# Patient Record
Sex: MALE | Race: WHITE | Employment: FULL TIME | URBAN - METROPOLITAN AREA
[De-identification: names, ages, dates, MRNs, and addresses within clinical notes are randomized per-mention and may not be internally consistent; named-entity substitution may affect disease eponyms.]

---

## 2022-02-19 ENCOUNTER — HOSPITAL ENCOUNTER (EMERGENCY)
Age: 57
Discharge: HOME | End: 2022-02-19
Attending: EMERGENCY MEDICINE
Payer: COMMERCIAL

## 2022-02-19 VITALS
DIASTOLIC BLOOD PRESSURE: 99 MMHG | WEIGHT: 200 LBS | OXYGEN SATURATION: 97 % | HEART RATE: 65 BPM | SYSTOLIC BLOOD PRESSURE: 174 MMHG | BODY MASS INDEX: 28 KG/M2 | HEIGHT: 71 IN | TEMPERATURE: 97.6 F | RESPIRATION RATE: 16 BRPM

## 2022-02-19 DIAGNOSIS — K04.7 DENTAL INFECTION: ICD-10-CM

## 2022-02-19 DIAGNOSIS — K08.89 DENTALGIA: ICD-10-CM

## 2022-02-19 DIAGNOSIS — K02.9 DENTAL CARIES: Primary | ICD-10-CM

## 2022-02-19 DIAGNOSIS — R22.0 LEFT FACIAL SWELLING: ICD-10-CM

## 2022-02-19 PROCEDURE — 99283 EMERGENCY DEPT VISIT LOW MDM: CPT

## 2022-02-19 PROCEDURE — 6370000000 HC RX 637 (ALT 250 FOR IP): Performed by: EMERGENCY MEDICINE

## 2022-02-19 RX ORDER — AMOXICILLIN AND CLAVULANATE POTASSIUM 875; 125 MG/1; MG/1
1 TABLET, FILM COATED ORAL 2 TIMES DAILY
Qty: 14 TABLET | Refills: 0 | Status: SHIPPED | OUTPATIENT
Start: 2022-02-19 | End: 2022-02-26

## 2022-02-19 RX ORDER — IBUPROFEN 800 MG/1
800 TABLET ORAL EVERY 8 HOURS PRN
Qty: 21 TABLET | Refills: 0 | Status: SHIPPED | OUTPATIENT
Start: 2022-02-19 | End: 2022-02-26

## 2022-02-19 RX ORDER — IBUPROFEN 800 MG/1
800 TABLET ORAL ONCE
Status: COMPLETED | OUTPATIENT
Start: 2022-02-19 | End: 2022-02-19

## 2022-02-19 RX ORDER — AMOXICILLIN AND CLAVULANATE POTASSIUM 875; 125 MG/1; MG/1
1 TABLET, FILM COATED ORAL ONCE
Status: COMPLETED | OUTPATIENT
Start: 2022-02-19 | End: 2022-02-19

## 2022-02-19 RX ADMIN — AMOXICILLIN AND CLAVULANATE POTASSIUM 1 TABLET: 875; 125 TABLET, FILM COATED ORAL at 12:45

## 2022-02-19 RX ADMIN — IBUPROFEN 800 MG: 800 TABLET, FILM COATED ORAL at 12:45

## 2022-02-19 ASSESSMENT — PAIN SCALES - GENERAL: PAINLEVEL_OUTOF10: 10

## 2022-02-19 NOTE — ED PROVIDER NOTES
[address] [facility]  Department of Emergency Medicine   ED  Encounter Note  Admit Date/RoomTime: 2022 12:22 PM  ED Room:     NAME: Kristina Perez  : 1965  MRN: [id number]     Chief Complaint:  Dental Pain (TOP LEFT TOOTH PAIN SWELLING)    History of Present Illness        Kristina Perez is a 61 y.o. old male who presents to the emergency department by private vehicle, for non-traumatic left upper tooth and face pain, which occured 1 day(s) prior to arrival.  Since onset the symptoms have been gradually worsening and moderate in severity. Worsened by  chewing and improved by nothing. Associated Signs & Symptoms:  nothing additional.  Reports 2 days ago he bitten to something and felt his left upper tooth \"crack\". Noticed some swelling beginning on his left side of his face yesterday, swelling significantly worsened so went to an urgent care. He was directed come to the emergency department due to consideration for IV antibiotics. He denies fevers chills poor tastes is handling all secretions. He states pain is minimal he has been applying ice with relief. Onset:       Spontaneous:   yes. Following Trauma:   no.     Previous Caries:   yes. Recent Dental Procedure:   no.     ROS   Pertinent positives and negatives are stated within HPI, all other systems reviewed and are negative. Past Medical History:  has no past medical history on file. Surgical History:  has no past surgical history on file. Social History:  reports that he has been smoking cigarettes. He has never used smokeless tobacco.    Family History: family history is not on file. Allergies: Patient has no known allergies.     Physical Exam   Oxygen Saturation Interpretation: Normal.        ED Triage Vitals   BP Temp Temp src Pulse Resp SpO2 Height Weight   22 1218 22 1218 -- 22 1218 22 1218 22 1218 22 1225 22 1225   (!) 174/99 97.6 °F (36.4 °C) 65 16 97 % 5' 11\" (1.803 m) 200 lb (90.7 kg)         · Constitutional:  Alert, development consistent with age. · HEENT:  NC/NT. Airway patent. · Neck:  Supple. Normal ROM. · Lips:  upper and lower normal.  · Mouth:  normal tongue and buccal mucosa. · Dental: Overall poor dentition, tooth #10 is tender to percussion with some erosion at the base. There is no pointing abscess. There is no trismus nor ANUG. .                    Trismus: No.         Drooling: No.           Airway stridor: No.  · Facial skin: left there is generalized erythema to the left upper portion of the face, does not violate the left lower eyelid. There is mild erythema. There is no abscess for drainage. There is no crepitance. .  There is no pain on extraocular movement. · Respiratory:  Clear to auscultation and breath sounds equal.    · CV: Regular rate and rhythm, normal heart sounds,   · Skin:  No rashes, erythema or lesions present, unless noted elsewhere. .  · Lymphatics: No lymphangitis or adenopathy noted. · Neurological:  Oriented. Motor functions intact. Lab / Imaging Results   (All laboratory and radiology results have been personally reviewed by myself)  Labs:  No results found for this visit on 02/19/22. Imaging: All Radiology results interpreted by Radiologist unless otherwise noted. No orders to display     ED Course / Medical Decision Making     Medications   amoxicillin-clavulanate (AUGMENTIN) 875-125 MG per tablet 1 tablet (1 tablet Oral Given 2/19/22 1245)   ibuprofen (ADVIL;MOTRIN) tablet 800 mg (800 mg Oral Given 2/19/22 1245)             Plan of Care/Counseling: Discussed with patient is tolerating p.o., no fevers chills or other systemic symptoms. Has been doing appropriate care with ice packs. Plan is for initiation of anti-inflammatories oral antibiotic therapy, will have him go as a walk-in appointment to the dental clinic on Monday.       Darcie Keller DO reviewed today's visit with the patient in addition to providing specific details for the plan of care and counseling regarding the diagnosis and prognosis. Questions are answered at this time and are agreeable with the plan  . Assessment      1. Dental caries    2. Dentalgia    3. Dental infection    4. Left facial swelling      Plan   Discharged home. Patient condition is stable    New Medications     New Prescriptions    AMOXICILLIN-CLAVULANATE (AUGMENTIN) 875-125 MG PER TABLET    Take 1 tablet by mouth 2 times daily for 7 days    IBUPROFEN (IBU) 800 MG TABLET    Take 1 tablet by mouth every 8 hours as needed for Pain     Electronically signed by Bull Burnham DO   DD: 2/19/22  **This report was transcribed using voice recognition software. Every effort was made to ensure accuracy; however, inadvertent computerized transcription errors may be present.   END OF ED PROVIDER NOTE       Bull Burnham DO  02/19/22 [address] [place] [address] Alex[place],   02/19/22 2922

## 2022-02-19 NOTE — Clinical Note
Naye Nicholson was seen and treated in our emergency department on 2/19/2022. He may return to work on 02/21/2022. If you have any questions or concerns, please don't hesitate to call.       Kacy Mckeon, DO

## 2022-02-19 NOTE — Clinical Note
Rolo Oliver was seen and treated in our emergency department on 2/19/2022. He may return to work on 02/21/2022. If you have any questions or concerns, please don't hesitate to call.       Rebecca Rubio, DO